# Patient Record
Sex: MALE | Race: WHITE | NOT HISPANIC OR LATINO | Employment: FULL TIME | URBAN - NONMETROPOLITAN AREA
[De-identification: names, ages, dates, MRNs, and addresses within clinical notes are randomized per-mention and may not be internally consistent; named-entity substitution may affect disease eponyms.]

---

## 2019-10-04 ENCOUNTER — OFFICE VISIT (OUTPATIENT)
Dept: NEUROSURGERY | Facility: CLINIC | Age: 59
End: 2019-10-04

## 2019-10-04 VITALS — HEIGHT: 71 IN | BODY MASS INDEX: 28.56 KG/M2 | RESPIRATION RATE: 16 BRPM | WEIGHT: 204 LBS | TEMPERATURE: 98.1 F

## 2019-10-04 DIAGNOSIS — M51.26 HNP (HERNIATED NUCLEUS PULPOSUS), LUMBAR: ICD-10-CM

## 2019-10-04 DIAGNOSIS — M54.16 LUMBAR RADICULOPATHY: Primary | ICD-10-CM

## 2019-10-04 PROCEDURE — 99203 OFFICE O/P NEW LOW 30 MIN: CPT | Performed by: NEUROLOGICAL SURGERY

## 2019-10-04 RX ORDER — LISINOPRIL 20 MG/1
TABLET ORAL
COMMUNITY
Start: 2019-08-29

## 2019-10-07 NOTE — PROGRESS NOTES
PATIENT:  ANAHY ALFARO   : 1960      HISTORY OF PRESENT ILLNESS: I saw Anahy Alfaro in neurosurgical consultation. He is a 59-year-old with a history of interesting back and LEFT leg pain. 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 He reported  weekend that he had onset of some back and right proximal hip pain that radiated to his right leg and down to his foot. He had the insidious onset over the ensuing week of foot drop with some EHL weakness on the right. It has been persistent in nature. He finally got an MRI on 2019 and is here for evaluation. He really does not have that much pain. He has some intermittent drawing of his right hamstring but is still plagued with some slapping of his right foot. He underwent an EMG nerve conduction study as well as an MRI of the back and is here for evaluation. He has done some conservative therapies. He is still working and is accompanied by his wife.     PAST MEDICAL HISTORY/SOCIAL HISTORY: Completely unremarkable. He has had a colonoscopy he did well with and he drinks a little bit but otherwise is completely healthy. I reviewed it all. He does have a history of some chronic left paraspinal pain.     PHYSICAL EXAMINATION: His cranial nerves are intact. He is awake, alert, follows commands, fluent speech, appears in no apparent distress. He has no muscle bulk or tone loss in his upper extremities. His arms are strong. He has no Alberto’s reflex. His reflexes are trace throughout. He has decreased vibratory sensation in his right foot but not his left. He has no clonus, long tract signs. His gait shows the presence of some foot drop on the right. He does have pretty profound weakness in his right EHL. It is really probably 2 out of 5. He has no signs of intrinsic hip dysfunction. He does have a straight leg raise sign.     DIAGNOSTIC DATA: I reviewed an MRI. It is from the 2019. 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 He probably is working on a spondylolisthesis as well. His EMG nerve conduction study showed that there are some nerve conduction velocities that are low end, they report the left tibial nerves and he shows some right-sided nerve root compression that is ongoing. This was done in August.    IMPRESSION/RECOMMENDATIONS: From my standpoint, the gentleman presents relatively typical history of a delayed presentation of a foot drop. He has intermittent pain that honestly it seems like he can live with, but his weakness has very much persisted. I think it is reasonable to get a repeat MRI just to ensure his disk has not resolved. I have seen this before where he has a massive reduction in the size of the mass effect with ongoing strength improvement because he is somewhat improved but just not where he wants to be. I think it makes no sense to proceed with a surgical intervention if his disk is significantly smaller. If the disk is the same size, that would change our plan and I would recommend and explained to him a right-sided L4-5 diskectomy. We will call him with the results of his MRI and get a lumbar flexion-extension film. Hopefully he will not need a surgery, but he is likely a candidate for a 4-5 lumbar disk.         Bhanu Wong MD  CNR/mth

## 2019-10-14 ENCOUNTER — HOSPITAL ENCOUNTER (OUTPATIENT)
Dept: MRI IMAGING | Facility: HOSPITAL | Age: 59
Discharge: HOME | End: 2019-10-14
Admitting: NEUROLOGICAL SURGERY

## 2019-10-14 ENCOUNTER — HOSPITAL ENCOUNTER (OUTPATIENT)
Dept: GENERAL RADIOLOGY | Facility: HOSPITAL | Age: 59
Discharge: HOME | End: 2019-10-14

## 2019-10-14 DIAGNOSIS — M54.16 LUMBAR RADICULOPATHY: ICD-10-CM

## 2019-10-14 DIAGNOSIS — M51.26 HNP (HERNIATED NUCLEUS PULPOSUS), LUMBAR: ICD-10-CM

## 2019-10-14 PROCEDURE — 72148 MRI LUMBAR SPINE W/O DYE: CPT

## 2019-10-14 PROCEDURE — 72120 X-RAY BEND ONLY L-S SPINE: CPT

## 2019-10-17 ENCOUNTER — DOCUMENTATION (OUTPATIENT)
Dept: NEUROSURGERY | Facility: CLINIC | Age: 59
End: 2019-10-17

## 2019-10-17 ENCOUNTER — TELEPHONE (OUTPATIENT)
Dept: NEUROSURGERY | Facility: CLINIC | Age: 59
End: 2019-10-17

## 2019-10-17 NOTE — PROGRESS NOTES
I talked to Broderick Bejarano I reviewed his repeat MRI he has had market resolution of his inferiorly extruded L5 disc herniation he does have intraforaminal disease that is mild to moderate is reported to plateau he has a paucity of pain.    I recommended against surgical exploration at the present time given his market reduction recommended physical therapy and counseled him on the signs and symptoms to look for to necessitate a follow-up

## 2019-12-27 ENCOUNTER — DOCUMENTATION (OUTPATIENT)
Dept: NEUROSURGERY | Facility: CLINIC | Age: 59
End: 2019-12-27